# Patient Record
(demographics unavailable — no encounter records)

---

## 2019-06-30 NOTE — NUR
D/C INSTRUCTIONS DISCUSSED.NO QUESTIONS OR CONCERNS. EXPERIENCED MOM ALANA NB
CARE WELL. GOOD SUPPORT AND ASSISTANCE FROM S.O.